# Patient Record
Sex: FEMALE | Race: WHITE
[De-identification: names, ages, dates, MRNs, and addresses within clinical notes are randomized per-mention and may not be internally consistent; named-entity substitution may affect disease eponyms.]

---

## 2017-03-28 ENCOUNTER — HOSPITAL ENCOUNTER (OUTPATIENT)
Dept: HOSPITAL 80 - CIMAGING | Age: 72
End: 2017-03-28
Attending: GENERAL ACUTE CARE HOSPITAL
Payer: COMMERCIAL

## 2017-03-28 DIAGNOSIS — Z85.3: ICD-10-CM

## 2017-03-28 DIAGNOSIS — Z80.3: ICD-10-CM

## 2017-03-28 DIAGNOSIS — Z12.31: Primary | ICD-10-CM

## 2017-03-28 PROCEDURE — G0202 SCR MAMMO BI INCL CAD: HCPCS

## 2017-05-12 ENCOUNTER — HOSPITAL ENCOUNTER (OUTPATIENT)
Dept: HOSPITAL 80 - FIMAGING | Age: 72
End: 2017-05-12
Attending: INTERNAL MEDICINE
Payer: COMMERCIAL

## 2017-05-12 DIAGNOSIS — R09.89: Primary | ICD-10-CM

## 2017-05-12 DIAGNOSIS — D05.11: ICD-10-CM

## 2017-05-12 DIAGNOSIS — M85.80: ICD-10-CM

## 2017-06-11 ENCOUNTER — HOSPITAL ENCOUNTER (EMERGENCY)
Dept: HOSPITAL 80 - CED | Age: 72
Discharge: HOME | End: 2017-06-11
Payer: COMMERCIAL

## 2017-06-11 VITALS
SYSTOLIC BLOOD PRESSURE: 120 MMHG | RESPIRATION RATE: 18 BRPM | OXYGEN SATURATION: 96 % | TEMPERATURE: 98.6 F | DIASTOLIC BLOOD PRESSURE: 73 MMHG | HEART RATE: 104 BPM

## 2017-06-11 DIAGNOSIS — Z85.3: ICD-10-CM

## 2017-06-11 DIAGNOSIS — Z79.82: ICD-10-CM

## 2017-06-11 DIAGNOSIS — K04.7: Primary | ICD-10-CM

## 2017-06-11 DIAGNOSIS — Z87.891: ICD-10-CM

## 2017-06-11 DIAGNOSIS — I10: ICD-10-CM

## 2017-06-11 NOTE — EDPHY
H & P


Stated Complaint: swelling in face/mouth since Thurs and pain; saw dentist Fri


Time Seen by Provider: 06/11/17 09:41


HPI/ROS: 





CHIEF COMPLAINT:  Lip swelling





History by patient





HISTORY OF PRESENT ILLNESS:  71-year-old woman with a history of hypertension 

who was recently started on a blood pressure medicine that ends in "-pril" 

presents complaining of 3-4 days of upper lip pain and swelling. Patient feels 

that there is a throbbing going on and that her upper lip is very swollen.  She 

said that yesterday it was red.  One week ago she bit down on something and 

felt that it may have loosened her crown.  The swelling seemed to worsen, and 

her tooth became painful and felt like her crown was loose on Thursday so she 

saw the dentist on Friday, 2 days ago, and they took x-rays and told her she 

had a minor caries, and that they would like to replace the crown but there was 

no evidence of infection or other problem.  Subsequently, it has become more 

painful and more swollen.  Swelling seems worse at night.  She denies any fever

, chills, nausea or vomiting. She has not taken anything for the pain. She 

denies any difficulty breathing or involvement of her tongue or lower lip.





REVIEW OF SYSTEMS:


As in HPI, and all other systems reviewed and are negative


Source: Patient





- Personal History


Current Tetanus/Diphtheria Vaccine: Yes





- Medical/Surgical History


Hx Asthma: No


Hx Chronic Respiratory Disease: No


Hx Diabetes: No


Hx Cardiac Disease: Yes


Hx Renal Disease: No


Hx Cirrhosis: No


Hx Alcoholism: No


Hx HIV/AIDS: No


Hx Splenectomy or Spleen Trauma: No


Other PMH: breast CA.  foot surgery.  HTN, cholesterol





- Social History


Smoking Status: Former smoker





- Physical Exam


Exam: 





General Appearance:  Alert and no distress.


Head: normocephalic, atraumatic, no sinus tenderness


Eyes:  Pupils equal and round no injection.


OP: mucus membranes moist, positive upper lip swelling especially on mucosal 

side, positive tenderness along the upper lip near for frenulum, no redness, 

positive tenderness to right front tooth, tooth is not loose, positive pea size 

swelling and ecchymoses above right front tooth at the gingival mucosal 

interface which is tender, no drainage


Neck: no meningismus, no cervical nodes, no submandibular nodes


Respiratory:  Chest is nontender, lungs are clear to auscultation.


Cardiac:  regular rate and rhythm.


Gastrointestinal:  Abdomen is soft and nontender, no masses, bowel sounds 

normal.


Musculoskeletal:  Neck is supple and nontender.


Extremities have full range of motion and are nontender.


Skin:  No rashes or lesions.


Constitutional: 


 Initial Vital Signs











Temperature (C)  37 C   06/11/17 09:38


 


Heart Rate  104 H  06/11/17 09:38


 


Respiratory Rate  18   06/11/17 09:38


 


Blood Pressure  120/73   06/11/17 09:38


 


O2 Sat (%)  96   06/11/17 09:38











Allergies/Adverse Reactions: 


 





No Known Allergies Allergy (Verified 06/11/17 09:43)


 








Home Medications: 














 Medication  Instructions  Recorded


 


" Blue Bp Pill"  06/11/17


 


ASPIRIN  06/11/17


 


Penicillin V Potassium [Pen Vk 500 mg PO Q6H 10 Days 06/11/17





500mg (*)]  


 


Simvastatin  06/11/17


 


VENLAFAXINE HCL  06/11/17


 


VITAMIN D  06/11/17














Medical Decision Making


ED Course/Re-evaluation: 





71-year-old woman presents with swelling of her upper lip and subjective facial 

swelling as well as tenderness and a lesion above her right front tooth.  She 

was seen by a dentist and told was no evidence of infection however I am 

concerned about the focal lesion on her gingiva as an abscess and the patient 

will be started on antibiotics.  In addition because this all occurred around 

the same time she began taking a new blood pressure medicine that sounds like 

an ACE-inhibitor I cannot exclude the some her facial swelling is related to 

this and angioedema and I recommended she stop her ACE-inhibitor and discuss 

with her primary care physician as soon as possible what other type of blood 

pressure medicine she could take.  I am recommending close follow-up with her 

dentist as well. We also discussed symptomatic treatment including saltwater 

rinses, Tylenol, ibuprofen and ice.





Departure





- Departure


Disposition: Home, Routine, Self-Care


Clinical Impression: 


 Dental infection





Condition: Good


Instructions:  Dental Abscess (ED)


Additional Instructions: 


You were seen by Dr. Carrie Jordan today. 





Take penicillin as prescribed.  Try using ice to relieve the pain and swelling 

and keep her head elevated at night.  You may take Tylenol 1000 mg every 6 

hours and/or ibuprofen 400 mg 4 times a day as needed for pain and swelling.





Please follow up with a dentist next week.





Please stop taking her blood pressure medicine and call your doctor tomorrow to 

discuss changing the class of medication as this may be contributing to your 

facial swelling.





Return for any worsening or new concerns.


Referrals: 


Ann Chavez MD [Primary Care Provider] - As per Instructions


Prescriptions: 


Penicillin V Potassium [Pen Vk 500mg (*)] 500 mg PO Q6H 10 Days

## 2017-07-06 ENCOUNTER — HOSPITAL ENCOUNTER (OUTPATIENT)
Dept: HOSPITAL 80 - FIMAGING | Age: 72
End: 2017-07-06
Attending: INTERNAL MEDICINE
Payer: COMMERCIAL

## 2017-07-06 DIAGNOSIS — Z13.820: Primary | ICD-10-CM

## 2017-07-06 DIAGNOSIS — M85.80: ICD-10-CM

## 2017-07-06 DIAGNOSIS — Z85.3: ICD-10-CM

## 2018-01-25 ENCOUNTER — HOSPITAL ENCOUNTER (OUTPATIENT)
Dept: HOSPITAL 80 - FCPNEURO | Age: 73
End: 2018-01-25
Attending: PSYCHIATRY & NEUROLOGY
Payer: COMMERCIAL

## 2018-01-25 DIAGNOSIS — G47.33: Primary | ICD-10-CM

## 2018-01-25 DIAGNOSIS — G47.37: ICD-10-CM

## 2018-01-25 DIAGNOSIS — G47.39: ICD-10-CM

## 2018-04-03 ENCOUNTER — HOSPITAL ENCOUNTER (OUTPATIENT)
Dept: HOSPITAL 80 - CIMAGING | Age: 73
End: 2018-04-03
Attending: INTERNAL MEDICINE
Payer: COMMERCIAL

## 2018-04-03 DIAGNOSIS — Z80.3: ICD-10-CM

## 2018-04-03 DIAGNOSIS — Z12.31: Primary | ICD-10-CM

## 2018-05-23 ENCOUNTER — HOSPITAL ENCOUNTER (OUTPATIENT)
Dept: HOSPITAL 80 - CIMAGING | Age: 73
End: 2018-05-23
Attending: INTERNAL MEDICINE
Payer: COMMERCIAL

## 2018-05-23 DIAGNOSIS — I10: ICD-10-CM

## 2018-05-23 DIAGNOSIS — R91.8: Primary | ICD-10-CM

## 2018-08-18 ENCOUNTER — HOSPITAL ENCOUNTER (EMERGENCY)
Age: 73
Discharge: HOME | End: 2018-08-18
Payer: COMMERCIAL

## 2018-08-18 DIAGNOSIS — Z87.891: ICD-10-CM

## 2018-08-18 DIAGNOSIS — K21.0: ICD-10-CM

## 2018-08-18 DIAGNOSIS — Z85.3: ICD-10-CM

## 2018-08-18 DIAGNOSIS — R07.9: Primary | ICD-10-CM

## 2019-04-04 ENCOUNTER — HOSPITAL ENCOUNTER (OUTPATIENT)
Dept: HOSPITAL 80 - CIMAGING | Age: 74
End: 2019-04-04
Attending: INTERNAL MEDICINE
Payer: COMMERCIAL

## 2019-04-04 DIAGNOSIS — Z85.3: ICD-10-CM

## 2019-04-04 DIAGNOSIS — Z80.3: ICD-10-CM

## 2019-04-04 DIAGNOSIS — Z12.31: Primary | ICD-10-CM
